# Patient Record
Sex: FEMALE | Race: BLACK OR AFRICAN AMERICAN | NOT HISPANIC OR LATINO | Employment: UNEMPLOYED | URBAN - METROPOLITAN AREA
[De-identification: names, ages, dates, MRNs, and addresses within clinical notes are randomized per-mention and may not be internally consistent; named-entity substitution may affect disease eponyms.]

---

## 2023-10-25 ENCOUNTER — TELEPHONE (OUTPATIENT)
Dept: OBSTETRICS AND GYNECOLOGY | Facility: CLINIC | Age: 38
End: 2023-10-25
Payer: MEDICAID

## 2023-10-25 NOTE — TELEPHONE ENCOUNTER
Pt has been scheduled for 11/2 with hossein valdez.   ----- Message from Hossein Valdez NP sent at 10/25/2023 11:47 AM CDT -----  Contact: patient    ----- Message -----  From: Roxana Fuentes  Sent: 10/25/2023   9:32 AM CDT  To: Milton HAQUE Staff    Type:  Patient Returning Call    Who Called:Patient     Who Left Message for Patient:Jomaranup     Does the patient know what this is regarding?:yes     Would the patient rather a call back or a response via [facility]? Call     Best Call Back Number:[phone] (home)      Additional Information:

## 2023-10-25 NOTE — TELEPHONE ENCOUNTER
Called pt. No answer mailbox is full. Pt does not have mychart  ----- Message from Tasia Rose NP sent at 10/23/2023  2:35 PM CDT -----  Regarding: FW: preg comfirmation  Contact: Patient  Please schedule patient for pregnancy confirm around 6-8 weeks of pregnancy AND after 5 day quarantine since she recently tested positive for COVID. Thanks.  ----- Message -----  From: Ana Paula Orozco  Sent: 10/19/2023  11:47 AM CDT  To: Yuri Holman Staff  Subject: preg comfirmation                                Type:  Sooner Appointment Request    Caller is requesting a sooner appointment.  Caller declined first available appointment listed below.  Caller will not accept being placed on the waitlist and is requesting a message be sent to doctor.    Name of Caller:  Patient    When is the first available appointment?      Symptoms:  medicaid - confirm preg    Would the patient rather a call back or a response via [facility]?     Best Call Back Number:  [phone]    Additional Information:  Tested positive for covid yesterday; call pt to be scheduled thanks!

## 2023-11-07 ENCOUNTER — OFFICE VISIT (OUTPATIENT)
Dept: FAMILY MEDICINE | Facility: CLINIC | Age: 38
End: 2023-11-07
Payer: MEDICAID

## 2023-11-07 VITALS
SYSTOLIC BLOOD PRESSURE: 118 MMHG | HEART RATE: 90 BPM | WEIGHT: 178.81 LBS | DIASTOLIC BLOOD PRESSURE: 60 MMHG | OXYGEN SATURATION: 96 % | TEMPERATURE: 99 F

## 2023-11-07 DIAGNOSIS — J30.2 SEASONAL ALLERGIES: ICD-10-CM

## 2023-11-07 DIAGNOSIS — R11.2 NAUSEA AND VOMITING, UNSPECIFIED VOMITING TYPE: ICD-10-CM

## 2023-11-07 DIAGNOSIS — Z32.01 PREGNANCY, CONFIRMED, NOT FIRST: Primary | ICD-10-CM

## 2023-11-07 PROCEDURE — 3078F PR MOST RECENT DIASTOLIC BLOOD PRESSURE < 80 MM HG: ICD-10-PCS | Mod: CPTII,S$GLB,, | Performed by: STUDENT IN AN ORGANIZED HEALTH CARE EDUCATION/TRAINING PROGRAM

## 2023-11-07 PROCEDURE — 99214 OFFICE O/P EST MOD 30 MIN: CPT | Mod: S$GLB,,, | Performed by: STUDENT IN AN ORGANIZED HEALTH CARE EDUCATION/TRAINING PROGRAM

## 2023-11-07 PROCEDURE — 1160F PR REVIEW ALL MEDS BY PRESCRIBER/CLIN PHARMACIST DOCUMENTED: ICD-10-PCS | Mod: CPTII,S$GLB,, | Performed by: STUDENT IN AN ORGANIZED HEALTH CARE EDUCATION/TRAINING PROGRAM

## 2023-11-07 PROCEDURE — 3078F DIAST BP <80 MM HG: CPT | Mod: CPTII,S$GLB,, | Performed by: STUDENT IN AN ORGANIZED HEALTH CARE EDUCATION/TRAINING PROGRAM

## 2023-11-07 PROCEDURE — 81025 POCT URINE PREGNANCY: ICD-10-PCS | Mod: S$GLB,,, | Performed by: STUDENT IN AN ORGANIZED HEALTH CARE EDUCATION/TRAINING PROGRAM

## 2023-11-07 PROCEDURE — 1159F MED LIST DOCD IN RCRD: CPT | Mod: CPTII,S$GLB,, | Performed by: STUDENT IN AN ORGANIZED HEALTH CARE EDUCATION/TRAINING PROGRAM

## 2023-11-07 PROCEDURE — 3074F SYST BP LT 130 MM HG: CPT | Mod: CPTII,S$GLB,, | Performed by: STUDENT IN AN ORGANIZED HEALTH CARE EDUCATION/TRAINING PROGRAM

## 2023-11-07 PROCEDURE — 3074F PR MOST RECENT SYSTOLIC BLOOD PRESSURE < 130 MM HG: ICD-10-PCS | Mod: CPTII,S$GLB,, | Performed by: STUDENT IN AN ORGANIZED HEALTH CARE EDUCATION/TRAINING PROGRAM

## 2023-11-07 PROCEDURE — 1160F RVW MEDS BY RX/DR IN RCRD: CPT | Mod: CPTII,S$GLB,, | Performed by: STUDENT IN AN ORGANIZED HEALTH CARE EDUCATION/TRAINING PROGRAM

## 2023-11-07 PROCEDURE — 1159F PR MEDICATION LIST DOCUMENTED IN MEDICAL RECORD: ICD-10-PCS | Mod: CPTII,S$GLB,, | Performed by: STUDENT IN AN ORGANIZED HEALTH CARE EDUCATION/TRAINING PROGRAM

## 2023-11-07 PROCEDURE — 81025 URINE PREGNANCY TEST: CPT | Mod: S$GLB,,, | Performed by: STUDENT IN AN ORGANIZED HEALTH CARE EDUCATION/TRAINING PROGRAM

## 2023-11-07 PROCEDURE — 99214 PR OFFICE/OUTPT VISIT, EST, LEVL IV, 30-39 MIN: ICD-10-PCS | Mod: S$GLB,,, | Performed by: STUDENT IN AN ORGANIZED HEALTH CARE EDUCATION/TRAINING PROGRAM

## 2023-11-07 RX ORDER — ONDANSETRON 4 MG/1
4 TABLET, FILM COATED ORAL EVERY 8 HOURS PRN
Qty: 30 TABLET | Refills: 1 | Status: SHIPPED | OUTPATIENT
Start: 2023-11-07 | End: 2023-12-07

## 2023-11-07 RX ORDER — FLUTICASONE PROPIONATE 50 MCG
1 SPRAY, SUSPENSION (ML) NASAL DAILY
Qty: 16 G | Refills: 5 | Status: SHIPPED | OUTPATIENT
Start: 2023-11-07 | End: 2023-12-07

## 2023-11-07 NOTE — PROGRESS NOTES
[facility] - [place], [place]    Family Medicine Office Visit    Subjective     Patient ID: Micheline Julien is a 28 y.o. female who presents to clinic today to establish care.     Medical history, surgical history, medications, allergies, and social history were reviewed and updated.     Chief Complaint: Establish Care and Emesis    Emesis         Establish care  She presents today to establish care. We have reviewed medical history, surgical history, family history, medications, allergies, and social history. EMR was updated appropriately.     Pregnancy and Nausea  She reported that her last known menstrual cycle was on .  She took an at home urine pregnancy test on  that was positive.  This is a desired pregnancy.  She is .  Point of care urine pregnancy test in our clinic is also positive.New appointment is scheduled for early December.  Recommended that she keep this appointment.  She did report nausea and vomiting that is worsening over the last few weeks.  She is requesting something to improve her symptoms that she is trying over-the-counter supplements without improvement.  We will plan to send in Zofran.  She is many concerns about the medication she is able to take safely while pregnant.  She reported that she took Tylenol frequently during her last pregnancy and was concerned that it may have affected her son.  Recommended she discuss this further with OBGYN to confirm what medication she can take safely.  Recommended against tobacco use pregnancy.  Also recommended against NSAID use.  She is on a prenatal vitamin and reports compliance.    Nasal congestion  She reported nasal congestion and a runny nose associated with her recent diagnosis of COVID.  She is requesting for the safety use during pregnancy for these symptoms.  We discussed Flonase.    Vitals:    23 1604   BP: 118/60   Pulse: 90   Temp: 98.5 °F (36.9 °C)      Wt Readings from  Last 3 Encounters:   11/07/23 1604 81.1 kg (178 lb 12.8 oz)      Review of Systems   Gastrointestinal:  Positive for vomiting.       Review of Systems otherwise negative unless specified above.        Objective     Physical Exam  Constitutional:       General: She is not in acute distress.     Appearance: Normal appearance.   HENT:      Head: Normocephalic and atraumatic.      Mouth/Throat:      Mouth: Mucous membranes are moist.   Cardiovascular:      Rate and Rhythm: Regular rhythm. Tachycardia present.      Heart sounds: No murmur heard.  Pulmonary:      Effort: Pulmonary effort is normal. No respiratory distress.      Breath sounds: Normal breath sounds. No wheezing.   Musculoskeletal:         General: Normal range of motion.   Skin:     General: Skin is warm and dry.   Neurological:      General: No focal deficit present.      Mental Status: She is alert and oriented to person, place, and time.   Psychiatric:         Mood and Affect: Mood normal.         Behavior: Behavior normal.            Assessment and Plan     Current Outpatient Medications   Medication Instructions    fluticasone propionate (FLONASE) 50 mcg, Each Nostril, Daily    ondansetron (ZOFRAN) 4 mg, Oral, Every 8 hours PRN    prenatal vit no.124/iron/folic (PRENATAL VITAMIN ORAL) 1 tablet, Oral        1. Pregnancy, confirmed, not first  -     POCT Urine Pregnancy    2. Nausea and vomiting, unspecified vomiting type  -     ondansetron (ZOFRAN) 4 MG tablet; Take 1 tablet (4 mg total) by mouth every 8 (eight) hours as needed for Nausea.  Dispense: 30 tablet; Refill: 1    3. Seasonal allergies  -     fluticasone propionate (FLONASE) 50 mcg/actuation nasal spray; 1 spray (50 mcg total) by Each Nostril route once daily.  Dispense: 16 g; Refill: 5        Positive plan of care urine pregnancy test as above.  First day of last menstrual period was September 6. She does have appointment scheduled with Dr. Blake on 12/6.  Recommended that she keep this  appointment.  We will otherwise send in Zofran for her nausea.  We will start Flonase for her nasal congestion.  We will otherwise plan to have her return in 6 months or sooner if needed.         No follow-ups on file.    Questions were invited and answered. No other acute concerns at this time. Will plan to follow up as above or sooner if needed.     Brenda Chaves,   11/07/2023 4:09 PM

## 2023-11-07 NOTE — PATIENT INSTRUCTIONS
Shawn Tobias,     If you are due for any health screening(s) below please notify me so we can arrange them to be ordered and scheduled. Most healthy patients at your age complete them, but you are free to accept or refuse.     If you can't do it, I'll definitely understand. If you can, I'd certainly appreciate it!    Tests to Keep You Healthy    Cervical Cancer Screening: DUE      Your cervical cancer screening is due     Our records indicate that you may be overdue for your screening Pap smear. A Pap smear is an important health screening that can detect abnormal cells that can become cervical cancer. Cervical cancer screenings allow for early diagnosis and increase the likelihood of successful treatment.     The current recommendation for Pap smear screening is every 3-5 years for women at average risk. We encourage you to schedule your appointment with your womens health provider. Many women see a gynecologist for this screening, but some primary care providers also provide Pap screening.     If you recently had your Pap smear screening performed outside of [facility], please let your health care team know so that they can update your health record.

## 2023-11-08 LAB
B-HCG UR QL: POSITIVE
CTP QC/QA: YES

## 2023-12-04 NOTE — PROGRESS NOTES
Chief Complaint   Patient presents with    Possible Pregnancy     Pregnancy confirmation       HPI:  38 y.o. female  presents for evaluation of amenorrhea. LMP 2023 (13w0d; LYNN 2024). Patient reports nausea/vomiting. She reports history of cerclage in her first pregnancy (thinks it was due to short cervix); did not get one in her second pregnancy. Patient lives in [place] and is currently unemployed. She cares for her kids; her youngest child is autistic.     Patient's last menstrual period was 2023 (exact date).    - Last Pap:  (unsure)  - History of abnormal paps: unsure  - Possible recent STD exposure: h/o CT    PMHx: oral HSV, scoliosis (receives nerve blocks), tobacco use   Meds: PNV  PSurgHx: h/o cerclage    PCP: Dr. Chaves    Past Medical History:   Diagnosis Date    HSV-1 (herpes simplex virus 1) infection     Lumbar herniated disc     L5    Scoliosis     Unspecified osteoarthritis, unspecified site     Right hand, left knee     Past Surgical History:   Procedure Laterality Date    CERVICAL CERCLAGE      VAGINAL DELIVERY      x2       Social History     Tobacco Use    Smoking status: Some Days     Current packs/day: 0.00     Types: Cigarettes     Last attempt to quit: 10/2023     Years since quittin.1    Smokeless tobacco: Never   Substance Use Topics    Alcohol use: Not Currently     Family History   Problem Relation Age of Onset    Breast cancer Mother     Seizures Father     Leukemia Maternal Grandmother     Cancer Paternal Grandmother      OB History    Para Term  AB Living   4 2 2   1 2   SAB IAB Ectopic Multiple Live Births   1       2      # Outcome Date [place] Lbr Daniele/2nd Weight Sex Delivery Anes PTL Lv   4 Term 22 38w0d  2.835 kg (6 lb 4 oz) M Vag-Spont EPI  BEVERLY   3 SAB            2 Term 10/23/09 39w0d  3.345 kg (7 lb 6 oz) M Vag-Spont EPI N BEVERLY      Birth Comments: cerclage   1                 MEDICATIONS: Reviewed with  "patient.  ALLERGIES: Patient has no known allergies.     ROS:  Review of Systems   Constitutional:  Negative for chills and fever.   HENT:  Negative for nasal congestion.    Eyes:  Negative for visual disturbance.   Respiratory:  Negative for shortness of breath.    Cardiovascular:  Negative for chest pain.   Gastrointestinal:  Positive for nausea and vomiting. Negative for abdominal pain.   Genitourinary:  Negative for pelvic pain, vaginal bleeding and vaginal discharge.   Integumentary:  Negative for rash.   Neurological:  Negative for headaches.   Psychiatric/Behavioral:  The patient is not nervous/anxious.        PHYSICAL EXAM:    /72   Pulse 94   Resp 12   Ht 5' 6" (1.676 m)   Wt 79.7 kg (175 lb 12.8 oz)   LMP 09/06/2023 (Exact Date)   BMI 28.37 kg/m²     Physical Exam:   Constitutional: She is oriented to person, place, and time. She appears well-developed and well-nourished. No distress.    HENT:   Head: Normocephalic and atraumatic.      Cardiovascular:  Normal rate.             Pulmonary/Chest: Effort normal. No respiratory distress.                  Musculoskeletal: Normal range of motion. No tenderness.       Neurological: She is alert and oriented to person, place, and time.    Skin: Skin is warm and dry.    Psychiatric: She has a normal mood and affect.         ASSESSMENT & PLAN:   Amenorrhea  -     POCT Urine Pregnancy positive  -     Patient counseled about NIPT and is interested  -     Pap, 1T labs, and NIPT at next visit  -     Dating ultrasound ordered  -     Counseled to avoid cat litter, not garden without gloves, avoid raw meat, heat up deli meat, to eat large fish like tuna no more than once a week, and to avoid soft unpasteurized cheeses.  I recommend a PNV daily.  She should avoid ibuprofen.    Nausea/vomiting in pregnancy  -     doxylamine succinate (UNISOM, DOXYLAMINE,) 25 mg tablet; Take 1 tablet (25 mg total) by mouth nightly as needed for Insomnia. Take 25mg of unisom and " 25mg of vitamin B6 every night.  If you are still having nausea you can take 12.5mg (0.5 tab) of unisom and 12.5mg (0.5 tab) of vitamin B6 in the morning starting on day 3.  If your nausea persists, you can add 12.5mg of unisom and 12.5mg of B6 in the afternoon starting on day 4.  Dispense: 60 tablet; Refill: 1  -     pyridoxine, vitamin B6, (B-6) 25 MG Tab; Take 1 tablet (25 mg total) by mouth nightly as needed (nausea). Take 25mg of unisom and 25mg of vitamin B6 every night.  If you are still having nausea you can take 12.5mg (0.5 tab) of unisom and 12.5mg (0.5 tab) of vitamin B6 in the morning starting on day 3.  If your nausea persists, you can add 12.5mg of unisom and 12.5mg of B6 in the afternoon starting on day 4.  Dispense: 60 tablet; Refill: 1    Tobacco use  -     Patient is a daily smoker and is interested in quitting; she understands risks in pregnancy  -     Ambulatory referral/consult to Smoking Cessation Program; Future; Expected date: 2023    History of cerclage, currently pregnant  -     Patient reports h/o cerclage in first pregnancy () in [place], thinks it was secondary to short cervix. She did not get a cerclage with her second child but reports  cervical dilation, but went on to deliver at term.   -     Ambulatory referral/consult to Perinatology; Future; Expected date: 2023    Other orders  -     PNV,calcium 72/iron/folic acid (PRENATAL VITAMIN) Tab; Take 1 tablet by mouth once daily.  Dispense: 30 tablet; Refill: 11      Return to clinic in 4 weeks for initial OB visit.     Flaquita Blake MD  OB/GYN

## 2023-12-06 ENCOUNTER — OFFICE VISIT (OUTPATIENT)
Dept: OBSTETRICS AND GYNECOLOGY | Facility: CLINIC | Age: 38
End: 2023-12-06
Payer: MEDICAID

## 2023-12-06 VITALS
BODY MASS INDEX: 28.26 KG/M2 | WEIGHT: 175.81 LBS | SYSTOLIC BLOOD PRESSURE: 116 MMHG | HEART RATE: 94 BPM | DIASTOLIC BLOOD PRESSURE: 72 MMHG | HEIGHT: 66 IN | RESPIRATION RATE: 12 BRPM

## 2023-12-06 DIAGNOSIS — N91.2 AMENORRHEA: Primary | ICD-10-CM

## 2023-12-06 DIAGNOSIS — O21.9 NAUSEA/VOMITING IN PREGNANCY: ICD-10-CM

## 2023-12-06 DIAGNOSIS — Z98.890 HISTORY OF CERCLAGE, CURRENTLY PREGNANT: ICD-10-CM

## 2023-12-06 DIAGNOSIS — Z72.0 TOBACCO USE: ICD-10-CM

## 2023-12-06 DIAGNOSIS — O09.299 HISTORY OF CERCLAGE, CURRENTLY PREGNANT: ICD-10-CM

## 2023-12-06 PROBLEM — Z32.01 PREGNANCY, CONFIRMED, NOT FIRST: Status: RESOLVED | Noted: 2023-11-07 | Resolved: 2023-12-06

## 2023-12-06 LAB
B-HCG UR QL: POSITIVE
CTP QC/QA: YES

## 2023-12-06 PROCEDURE — 99204 PR OFFICE/OUTPT VISIT, NEW, LEVL IV, 45-59 MIN: ICD-10-PCS | Mod: TH,S$GLB,, | Performed by: STUDENT IN AN ORGANIZED HEALTH CARE EDUCATION/TRAINING PROGRAM

## 2023-12-06 PROCEDURE — 99204 OFFICE O/P NEW MOD 45 MIN: CPT | Mod: TH,S$GLB,, | Performed by: STUDENT IN AN ORGANIZED HEALTH CARE EDUCATION/TRAINING PROGRAM

## 2023-12-06 PROCEDURE — 1159F MED LIST DOCD IN RCRD: CPT | Mod: CPTII,S$GLB,, | Performed by: STUDENT IN AN ORGANIZED HEALTH CARE EDUCATION/TRAINING PROGRAM

## 2023-12-06 PROCEDURE — 3074F SYST BP LT 130 MM HG: CPT | Mod: CPTII,S$GLB,, | Performed by: STUDENT IN AN ORGANIZED HEALTH CARE EDUCATION/TRAINING PROGRAM

## 2023-12-06 PROCEDURE — 3008F BODY MASS INDEX DOCD: CPT | Mod: CPTII,S$GLB,, | Performed by: STUDENT IN AN ORGANIZED HEALTH CARE EDUCATION/TRAINING PROGRAM

## 2023-12-06 PROCEDURE — 1159F PR MEDICATION LIST DOCUMENTED IN MEDICAL RECORD: ICD-10-PCS | Mod: CPTII,S$GLB,, | Performed by: STUDENT IN AN ORGANIZED HEALTH CARE EDUCATION/TRAINING PROGRAM

## 2023-12-06 PROCEDURE — 81025 POCT URINE PREGNANCY: ICD-10-PCS | Mod: S$GLB,,, | Performed by: STUDENT IN AN ORGANIZED HEALTH CARE EDUCATION/TRAINING PROGRAM

## 2023-12-06 PROCEDURE — 3008F PR BODY MASS INDEX (BMI) DOCUMENTED: ICD-10-PCS | Mod: CPTII,S$GLB,, | Performed by: STUDENT IN AN ORGANIZED HEALTH CARE EDUCATION/TRAINING PROGRAM

## 2023-12-06 PROCEDURE — 81025 URINE PREGNANCY TEST: CPT | Mod: S$GLB,,, | Performed by: STUDENT IN AN ORGANIZED HEALTH CARE EDUCATION/TRAINING PROGRAM

## 2023-12-06 PROCEDURE — 3074F PR MOST RECENT SYSTOLIC BLOOD PRESSURE < 130 MM HG: ICD-10-PCS | Mod: CPTII,S$GLB,, | Performed by: STUDENT IN AN ORGANIZED HEALTH CARE EDUCATION/TRAINING PROGRAM

## 2023-12-06 PROCEDURE — 3078F PR MOST RECENT DIASTOLIC BLOOD PRESSURE < 80 MM HG: ICD-10-PCS | Mod: CPTII,S$GLB,, | Performed by: STUDENT IN AN ORGANIZED HEALTH CARE EDUCATION/TRAINING PROGRAM

## 2023-12-06 PROCEDURE — 3078F DIAST BP <80 MM HG: CPT | Mod: CPTII,S$GLB,, | Performed by: STUDENT IN AN ORGANIZED HEALTH CARE EDUCATION/TRAINING PROGRAM

## 2023-12-06 RX ORDER — PYRIDOXINE HCL (VITAMIN B6) 25 MG
25 TABLET ORAL NIGHTLY PRN
Qty: 60 TABLET | Refills: 1 | Status: SHIPPED | OUTPATIENT
Start: 2023-12-06

## 2023-12-06 RX ORDER — PRENATAL WITH FERROUS FUM AND FOLIC ACID 3080; 920; 120; 400; 22; 1.84; 3; 20; 10; 1; 12; 200; 27; 25; 2 [IU]/1; [IU]/1; MG/1; [IU]/1; MG/1; MG/1; MG/1; MG/1; MG/1; MG/1; UG/1; MG/1; MG/1; MG/1; MG/1
1 TABLET ORAL DAILY
Qty: 30 TABLET | Refills: 11 | Status: SHIPPED | OUTPATIENT
Start: 2023-12-06 | End: 2024-12-05

## 2023-12-06 RX ORDER — DOXYLAMINE SUCCINATE 25 MG/1
25 TABLET ORAL NIGHTLY PRN
Qty: 60 TABLET | Refills: 1 | Status: SHIPPED | OUTPATIENT
Start: 2023-12-06

## 2023-12-07 DIAGNOSIS — O21.9 NAUSEA AND VOMITING DURING PREGNANCY: Primary | ICD-10-CM

## 2023-12-07 RX ORDER — ONDANSETRON 4 MG/1
4 TABLET, ORALLY DISINTEGRATING ORAL EVERY 6 HOURS PRN
Qty: 30 TABLET | Refills: 1 | Status: SHIPPED | OUTPATIENT
Start: 2023-12-07

## 2023-12-08 ENCOUNTER — TELEPHONE (OUTPATIENT)
Dept: MATERNAL FETAL MEDICINE | Facility: CLINIC | Age: 38
End: 2023-12-08

## 2023-12-08 ENCOUNTER — PROCEDURE VISIT (OUTPATIENT)
Dept: MATERNAL FETAL MEDICINE | Facility: CLINIC | Age: 38
End: 2023-12-08
Payer: MEDICAID

## 2023-12-08 DIAGNOSIS — O09.299 HISTORY OF CERCLAGE, CURRENTLY PREGNANT: ICD-10-CM

## 2023-12-08 DIAGNOSIS — N91.2 AMENORRHEA: ICD-10-CM

## 2023-12-08 DIAGNOSIS — Z36.89 ENCOUNTER FOR FETAL ANATOMIC SURVEY: Primary | ICD-10-CM

## 2023-12-08 DIAGNOSIS — Z98.890 HISTORY OF CERCLAGE, CURRENTLY PREGNANT: ICD-10-CM

## 2023-12-08 PROCEDURE — 76801 US OB/GYN PROCEDURE (VIEWPOINT): ICD-10-PCS | Mod: S$GLB,,, | Performed by: OBSTETRICS & GYNECOLOGY

## 2023-12-08 PROCEDURE — 76801 OB US < 14 WKS SINGLE FETUS: CPT | Mod: S$GLB,,, | Performed by: OBSTETRICS & GYNECOLOGY

## 2023-12-08 NOTE — TELEPHONE ENCOUNTER
Pt verified self by name, . NKA.    Pt in clinic and had u/s dating. LYNN 24.    RN offered pt soonest avail appt for consult with MFM. Pt state she is on the fence about getting another cerclage as she didn't have one with her second child and delivered close to full term. Pt expressed she had a bad experience with her 1st cerclage and does not want to go through with getting epidural twice during pregnancy as she has scoliosis.     RN set up patient for serial CL measurements & anatomy scan starting at 16w up to [id number]. Initial consult with MFM at 16w u/s. Pt agreed to appt time/date. RN offered patient if she changed her mind  to meet with MFM sooner than /3 to give me a call back.     Pt has not gotten PNLs & genetics done yet. PT unsure of what day she goes back to see her OB in January. RN reach out to Dr. Blake to have her office set up lab draw appt sooner than her next OB visit so MFM will have results for consult. Provider agreed.     Patient expressed her gratitude in her care with her OBGYN and with MFM office staff.     Pt verbalized understanding. Agreed to POC w intent to comply.

## 2023-12-10 DIAGNOSIS — Z3A.12 12 WEEKS GESTATION OF PREGNANCY: Primary | ICD-10-CM

## 2023-12-14 ENCOUNTER — LAB VISIT (OUTPATIENT)
Dept: LAB | Facility: CLINIC | Age: 38
End: 2023-12-14
Payer: MEDICAID

## 2023-12-14 DIAGNOSIS — Z3A.12 12 WEEKS GESTATION OF PREGNANCY: ICD-10-CM

## 2023-12-14 LAB
ABO + RH BLD: NORMAL
ANION GAP SERPL CALC-SCNC: 9 MMOL/L (ref 8–16)
BASOPHILS # BLD AUTO: 0.02 K/UL (ref 0–0.2)
BASOPHILS NFR BLD: 0.2 % (ref 0–1.9)
BLD GP AB SCN CELLS X3 SERPL QL: NORMAL
BUN SERPL-MCNC: 8 MG/DL (ref 6–20)
CALCIUM SERPL-MCNC: 9.3 MG/DL (ref 8.7–10.5)
CHLORIDE SERPL-SCNC: 104 MMOL/L (ref 95–110)
CO2 SERPL-SCNC: 20 MMOL/L (ref 23–29)
CREAT SERPL-MCNC: 0.7 MG/DL (ref 0.5–1.4)
DIFFERENTIAL METHOD: ABNORMAL
EOSINOPHIL # BLD AUTO: 0.4 K/UL (ref 0–0.5)
EOSINOPHIL NFR BLD: 3 % (ref 0–8)
ERYTHROCYTE [DISTWIDTH] IN BLOOD BY AUTOMATED COUNT: 12.1 % (ref 11.5–14.5)
EST. GFR  (NO RACE VARIABLE): >60 ML/MIN/1.73 M^2
GLUCOSE SERPL-MCNC: 98 MG/DL (ref 70–110)
HBV SURFACE AG SERPL QL IA: NORMAL
HCT VFR BLD AUTO: 31.2 % (ref 37–48.5)
HCV AB SERPL QL IA: NORMAL
HGB BLD-MCNC: 10.5 G/DL (ref 12–16)
HIV 1+2 AB+HIV1 P24 AG SERPL QL IA: NORMAL
IMM GRANULOCYTES # BLD AUTO: 0.04 K/UL (ref 0–0.04)
IMM GRANULOCYTES NFR BLD AUTO: 0.3 % (ref 0–0.5)
LYMPHOCYTES # BLD AUTO: 1.7 K/UL (ref 1–4.8)
LYMPHOCYTES NFR BLD: 14.7 % (ref 18–48)
MCH RBC QN AUTO: 32.1 PG (ref 27–31)
MCHC RBC AUTO-ENTMCNC: 33.7 G/DL (ref 32–36)
MCV RBC AUTO: 95 FL (ref 82–98)
MONOCYTES # BLD AUTO: 0.9 K/UL (ref 0.3–1)
MONOCYTES NFR BLD: 7.7 % (ref 4–15)
NEUTROPHILS # BLD AUTO: 8.7 K/UL (ref 1.8–7.7)
NEUTROPHILS NFR BLD: 74.1 % (ref 38–73)
NRBC BLD-RTO: 0 /100 WBC
PLATELET # BLD AUTO: 289 K/UL (ref 150–450)
PMV BLD AUTO: 10.9 FL (ref 9.2–12.9)
POTASSIUM SERPL-SCNC: 3.5 MMOL/L (ref 3.5–5.1)
RBC # BLD AUTO: 3.27 M/UL (ref 4–5.4)
SODIUM SERPL-SCNC: 133 MMOL/L (ref 136–145)
SPECIMEN OUTDATE: NORMAL
WBC # BLD AUTO: 11.68 K/UL (ref 3.9–12.7)

## 2023-12-14 PROCEDURE — 86592 SYPHILIS TEST NON-TREP QUAL: CPT | Performed by: STUDENT IN AN ORGANIZED HEALTH CARE EDUCATION/TRAINING PROGRAM

## 2023-12-14 PROCEDURE — 36415 PR COLLECTION VENOUS BLOOD,VENIPUNCTURE: ICD-10-PCS | Mod: ,,, | Performed by: STUDENT IN AN ORGANIZED HEALTH CARE EDUCATION/TRAINING PROGRAM

## 2023-12-14 PROCEDURE — 83020 HEMOGLOBIN ELECTROPHORESIS: CPT | Performed by: STUDENT IN AN ORGANIZED HEALTH CARE EDUCATION/TRAINING PROGRAM

## 2023-12-14 PROCEDURE — 83021 HEMOGLOBIN CHROMOTOGRAPHY: CPT | Performed by: STUDENT IN AN ORGANIZED HEALTH CARE EDUCATION/TRAINING PROGRAM

## 2023-12-14 PROCEDURE — 86762 RUBELLA ANTIBODY: CPT | Performed by: STUDENT IN AN ORGANIZED HEALTH CARE EDUCATION/TRAINING PROGRAM

## 2023-12-14 PROCEDURE — 80048 BASIC METABOLIC PNL TOTAL CA: CPT | Performed by: STUDENT IN AN ORGANIZED HEALTH CARE EDUCATION/TRAINING PROGRAM

## 2023-12-14 PROCEDURE — 85025 COMPLETE CBC W/AUTO DIFF WBC: CPT | Performed by: STUDENT IN AN ORGANIZED HEALTH CARE EDUCATION/TRAINING PROGRAM

## 2023-12-14 PROCEDURE — 86803 HEPATITIS C AB TEST: CPT | Performed by: STUDENT IN AN ORGANIZED HEALTH CARE EDUCATION/TRAINING PROGRAM

## 2023-12-14 PROCEDURE — 87389 HIV-1 AG W/HIV-1&-2 AB AG IA: CPT | Performed by: STUDENT IN AN ORGANIZED HEALTH CARE EDUCATION/TRAINING PROGRAM

## 2023-12-14 PROCEDURE — 87340 HEPATITIS B SURFACE AG IA: CPT | Performed by: STUDENT IN AN ORGANIZED HEALTH CARE EDUCATION/TRAINING PROGRAM

## 2023-12-14 PROCEDURE — 36415 COLL VENOUS BLD VENIPUNCTURE: CPT | Mod: ,,, | Performed by: STUDENT IN AN ORGANIZED HEALTH CARE EDUCATION/TRAINING PROGRAM

## 2023-12-14 PROCEDURE — 86850 RBC ANTIBODY SCREEN: CPT | Performed by: STUDENT IN AN ORGANIZED HEALTH CARE EDUCATION/TRAINING PROGRAM

## 2023-12-15 ENCOUNTER — OFFICE VISIT (OUTPATIENT)
Dept: FAMILY MEDICINE | Facility: CLINIC | Age: 38
End: 2023-12-15
Payer: MEDICAID

## 2023-12-15 VITALS
WEIGHT: 181.13 LBS | TEMPERATURE: 99 F | BODY MASS INDEX: 29.11 KG/M2 | SYSTOLIC BLOOD PRESSURE: 110 MMHG | DIASTOLIC BLOOD PRESSURE: 65 MMHG | OXYGEN SATURATION: 97 % | HEIGHT: 66 IN | HEART RATE: 110 BPM

## 2023-12-15 DIAGNOSIS — R06.2 WHEEZING: ICD-10-CM

## 2023-12-15 DIAGNOSIS — R50.9 FEVER, UNSPECIFIED FEVER CAUSE: Primary | ICD-10-CM

## 2023-12-15 LAB
RPR SER QL: NORMAL
RUBV IGG SER-ACNC: 53.1 IU/ML
RUBV IGG SER-IMP: REACTIVE

## 2023-12-15 PROCEDURE — 1160F PR REVIEW ALL MEDS BY PRESCRIBER/CLIN PHARMACIST DOCUMENTED: ICD-10-PCS | Mod: CPTII,S$GLB,, | Performed by: STUDENT IN AN ORGANIZED HEALTH CARE EDUCATION/TRAINING PROGRAM

## 2023-12-15 PROCEDURE — 99214 PR OFFICE/OUTPT VISIT, EST, LEVL IV, 30-39 MIN: ICD-10-PCS | Mod: S$GLB,,, | Performed by: STUDENT IN AN ORGANIZED HEALTH CARE EDUCATION/TRAINING PROGRAM

## 2023-12-15 PROCEDURE — 3074F SYST BP LT 130 MM HG: CPT | Mod: CPTII,S$GLB,, | Performed by: STUDENT IN AN ORGANIZED HEALTH CARE EDUCATION/TRAINING PROGRAM

## 2023-12-15 PROCEDURE — 3078F DIAST BP <80 MM HG: CPT | Mod: CPTII,S$GLB,, | Performed by: STUDENT IN AN ORGANIZED HEALTH CARE EDUCATION/TRAINING PROGRAM

## 2023-12-15 PROCEDURE — 1160F RVW MEDS BY RX/DR IN RCRD: CPT | Mod: CPTII,S$GLB,, | Performed by: STUDENT IN AN ORGANIZED HEALTH CARE EDUCATION/TRAINING PROGRAM

## 2023-12-15 PROCEDURE — 3008F PR BODY MASS INDEX (BMI) DOCUMENTED: ICD-10-PCS | Mod: CPTII,S$GLB,, | Performed by: STUDENT IN AN ORGANIZED HEALTH CARE EDUCATION/TRAINING PROGRAM

## 2023-12-15 PROCEDURE — 3078F PR MOST RECENT DIASTOLIC BLOOD PRESSURE < 80 MM HG: ICD-10-PCS | Mod: CPTII,S$GLB,, | Performed by: STUDENT IN AN ORGANIZED HEALTH CARE EDUCATION/TRAINING PROGRAM

## 2023-12-15 PROCEDURE — 1159F PR MEDICATION LIST DOCUMENTED IN MEDICAL RECORD: ICD-10-PCS | Mod: CPTII,S$GLB,, | Performed by: STUDENT IN AN ORGANIZED HEALTH CARE EDUCATION/TRAINING PROGRAM

## 2023-12-15 PROCEDURE — 3008F BODY MASS INDEX DOCD: CPT | Mod: CPTII,S$GLB,, | Performed by: STUDENT IN AN ORGANIZED HEALTH CARE EDUCATION/TRAINING PROGRAM

## 2023-12-15 PROCEDURE — 1159F MED LIST DOCD IN RCRD: CPT | Mod: CPTII,S$GLB,, | Performed by: STUDENT IN AN ORGANIZED HEALTH CARE EDUCATION/TRAINING PROGRAM

## 2023-12-15 PROCEDURE — 99214 OFFICE O/P EST MOD 30 MIN: CPT | Mod: S$GLB,,, | Performed by: STUDENT IN AN ORGANIZED HEALTH CARE EDUCATION/TRAINING PROGRAM

## 2023-12-15 PROCEDURE — 3074F PR MOST RECENT SYSTOLIC BLOOD PRESSURE < 130 MM HG: ICD-10-PCS | Mod: CPTII,S$GLB,, | Performed by: STUDENT IN AN ORGANIZED HEALTH CARE EDUCATION/TRAINING PROGRAM

## 2023-12-15 RX ORDER — AMOXICILLIN 500 MG/1
500 TABLET, FILM COATED ORAL EVERY 12 HOURS
Qty: 20 TABLET | Refills: 0 | Status: SHIPPED | OUTPATIENT
Start: 2023-12-15 | End: 2023-12-25

## 2023-12-15 RX ORDER — ALBUTEROL SULFATE 90 UG/1
2 AEROSOL, METERED RESPIRATORY (INHALATION) EVERY 6 HOURS PRN
Qty: 18 G | Refills: 0 | Status: SHIPPED | OUTPATIENT
Start: 2023-12-15 | End: 2024-12-14

## 2023-12-15 NOTE — PROGRESS NOTES
[facility]  [address], [address]  [place] [address]    Subjective     Patient ID: Micheline Julien is a 38 y.o. female who comes to the clinic for an acute visit.    Chief Complaint: Cough, Fever, Nasal Congestion (Patient flu  COVID positive get test yesterday ), and Headache    Children got sick and then she got sick soon after. Went to the  yesterday and was negative for covid and flu. Temp got to 101.0. Has been taking tylenol. Is wheezing    ROS negative unless stated above       Objective     Vitals:    12/15/23 1106   BP: 110/65   Pulse: 110   Temp: 98.5 °F (36.9 °C)        Physical Exam  Vitals reviewed.   Constitutional:       Appearance: Normal appearance.   HENT:      Head: Normocephalic and atraumatic.   Eyes:      General: No scleral icterus (albuterol).     Extraocular Movements: Extraocular movements intact.      Pupils: Pupils are equal, round, and reactive to light.   Cardiovascular:      Rate and Rhythm: Normal rate.      Pulses: Normal pulses.      Heart sounds: Normal heart sounds.   Pulmonary:      Effort: Pulmonary effort is normal. No respiratory distress.      Breath sounds: Wheezing present.   Musculoskeletal:         General: Normal range of motion.      Cervical back: Normal range of motion and neck supple.   Skin:     General: Skin is dry.      Capillary Refill: Capillary refill takes less than 2 seconds.   Neurological:      General: No focal deficit present.      Mental Status: She is alert and oriented to person, place, and time. Mental status is at baseline.   Psychiatric:         Mood and Affect: Mood normal.         Behavior: Behavior normal.         Thought Content: Thought content normal.         Wt Readings from Last 3 Encounters:   12/15/23 1106 82.1 kg (181 lb 1.6 oz)   12/06/23 0947 79.7 kg (175 lb 12.8 oz)   11/07/23 1604 81.1 kg (178 lb 12.8 oz)        Current Outpatient Medications   Medication  Instructions    albuterol (VENTOLIN HFA) 90 mcg/actuation inhaler 2 puffs, Inhalation, Every 6 hours PRN, Rescue    amoxicillin (AMOXIL) 500 mg, Oral, Every 12 hours    ondansetron (ZOFRAN-ODT) 4 mg, Oral, Every 6 hours PRN    PNV,calcium 72/iron/folic acid (PRENATAL VITAMIN) Tab 1 tablet, Oral, Daily    prenatal vit no.124/iron/folic (PRENATAL VITAMIN ORAL) 1 tablet, Oral    pyridoxine (vitamin B6) (B-6) 25 mg, Oral, Nightly PRN, Take 25mg of unisom and 25mg of vitamin B6 every night.  If you are still having nausea you can take 12.5mg (0.5 tab) of unisom and 12.5mg (0.5 tab) of vitamin B6 in the morning starting on day 3.  If your nausea persists, you can add 12.5mg of unisom and 12.5mg of B6 in the afternoon starting on day 4.    UNISOM (DOXYLAMINE) 25 mg, Oral, Nightly PRN, Take 25mg of unisom and 25mg of vitamin B6 every night.  If you are still having nausea you can take 12.5mg (0.5 tab) of unisom and 12.5mg (0.5 tab) of vitamin B6 in the morning starting on day 3.  If your nausea persists, you can add 12.5mg of unisom and 12.5mg of B6 in the afternoon starting on day 4.           Assessment and Plan     1. Fever, unspecified fever cause  -     amoxicillin (AMOXIL) 500 MG Tab; Take 1 tablet (500 mg total) by mouth every 12 (twelve) hours. for 10 days  Dispense: 20 tablet; Refill: 0    2. Wheezing  -     albuterol (VENTOLIN HFA) 90 mcg/actuation inhaler; Inhale 2 puffs into the lungs every 6 (six) hours as needed for Wheezing. Rescue  Dispense: 18 g; Refill: 0        Treating sinusitis w/ amoxil. Wheezing w/ albuterol. Went over SE.     Continue follow up w/ OBGYN, patient said she wanted to see her OBGYN before starting baby ASA. Continue PNV    Encouraged smoking cessation         I encouraged the patient to take all medications as prescribed and to keep follow up appointments with their providers. Patient stated they had no other concerns. Questions were invited and answered. Follow up sooner if need. ED  precautions given.    Mac Fairbanks MD  12/15/2023 11:13 AM

## 2023-12-20 DIAGNOSIS — D64.9 ANEMIA, UNSPECIFIED TYPE: Primary | ICD-10-CM

## 2023-12-20 LAB
HGB A2 MFR BLD HPLC: 2.6 % (ref 2.2–3.2)
HGB FRACT BLD ELPH-IMP: NORMAL
HGB FRACT BLD ELPH-IMP: NORMAL

## 2024-01-02 ENCOUNTER — PATIENT MESSAGE (OUTPATIENT)
Dept: MATERNAL FETAL MEDICINE | Facility: CLINIC | Age: 39
End: 2024-01-02
Payer: MEDICAID

## 2024-01-02 NOTE — PROGRESS NOTES
MATERNAL-FETAL MEDICINE   CONSULT NOTE    Provider requesting consultation: Flaquita Blake MD     SUBJECTIVE:     Ms. Micheline Julien is a 38 y.o.  female with IUP at 16w1d who is seen in consultation by M for evaluation and management of:  History of short cervix prior pregnancy with cerclage placement   Subsequent term delivery with no cerclage   Evaluation for management recommendations          Medication List with Changes/Refills   Current Medications    ALBUTEROL (VENTOLIN HFA) 90 MCG/ACTUATION INHALER    Inhale 2 puffs into the lungs every 6 (six) hours as needed for Wheezing. Rescue    DOXYLAMINE SUCCINATE (UNISOM, DOXYLAMINE,) 25 MG TABLET    Take 1 tablet (25 mg total) by mouth nightly as needed for Insomnia. Take 25mg of unisom and 25mg of vitamin B6 every night.  If you are still having nausea you can take 12.5mg (0.5 tab) of unisom and 12.5mg (0.5 tab) of vitamin B6 in the morning starting on day 3.  If your nausea persists, you can add 12.5mg of unisom and 12.5mg of B6 in the afternoon starting on day 4.    ONDANSETRON (ZOFRAN-ODT) 4 MG TBDL    Take 1 tablet (4 mg total) by mouth every 6 (six) hours as needed (nausea).    PNV,CALCIUM 72/IRON/FOLIC ACID (PRENATAL VITAMIN) TAB    Take 1 tablet by mouth once daily.    PRENATAL VIT NO.124/IRON/FOLIC (PRENATAL VITAMIN ORAL)    Take 1 tablet by mouth.    PYRIDOXINE, VITAMIN B6, (B-6) 25 MG TAB    Take 1 tablet (25 mg total) by mouth nightly as needed (nausea). Take 25mg of unisom and 25mg of vitamin B6 every night.  If you are still having nausea you can take 12.5mg (0.5 tab) of unisom and 12.5mg (0.5 tab) of vitamin B6 in the morning starting on day 3.  If your nausea persists, you can add 12.5mg of unisom and 12.5mg of B6 in the afternoon starting on day 4.       Review of patient's allergies indicates:  No Known Allergies    PMH:  Past Medical History:   Diagnosis Date    HSV-1 (herpes simplex virus 1) infection     Lumbar herniated disc     L5     "Scoliosis     Unspecified osteoarthritis, unspecified site     Right hand, left knee       PObHx:  OB History    Para Term  AB Living   4 2 2   1 2   SAB IAB Ectopic Multiple Live Births   1       2      # Outcome Date [place] Lbr Daniele/2nd Weight Sex Delivery Anes PTL Lv   4 Current            3 Term 22 38w0d  2.835 kg (6 lb 4 oz) M Vag-Spont EPI  BEVERLY   2 SAB            1 Term 10/23/09 39w0d  3.345 kg (7 lb 6 oz) M Vag-Spont EPI N BEVERLY      Birth Comments: cerclage       PSH:  Past Surgical History:   Procedure Laterality Date    CERVICAL CERCLAGE      VAGINAL DELIVERY      x2       Family history:family history includes Breast cancer in her mother; Cancer in her paternal grandmother; Leukemia in her maternal grandmother; Seizures in her father.    Social history: reports that she has been smoking cigarettes. She has never used smokeless tobacco. She reports that she does not currently use alcohol. She reports that she does not use drugs.  She is cutting down on tobacco intake---praised.     Genetic history:  The patient denies any inherited genetic diseases or birth defects in herself or her partner's personal history or family.    Objective:   Ht 5' 6" (1.676 m)   LMP 2023 (Exact Date) Comment: prot neg, gluc neg urine  BMI 29.23 kg/m²    /65   Ht 5' 6" (1.676 m)   Wt 81 kg (178 lb 7.4 oz)   LMP 2023 (Exact Date) Comment: prot neg, gluc neg urine  BMI 28.80 kg/m²      Physical Exam  WDWN in NAD     Ultrasound performed. See viewpoint for full ultrasound report.  Cervical length in normal on TV ultrasound.  Limited fetal anatomic views appear normal.  Fetal biometry measures appropriately based on LYNN from first trimester ultrasound.    Significant labs/imaging:  Vicki: low risk     ASSESSMENT/PLAN:     38 y.o.  female with IUP at 16w1d     History of Cerclage/Evaluation for Management recommendations  Patient had cerclage placed in first pregnancy in mid trimester " after US revealed short cervix.  Thereafter, she remained at home rest.  She did not have PTL . Ultimately, she delivered at 38 weeks.   Second pregnancy patient did not have cerclage, or US surveillance of cervical length. She delivered at 39 weeks.     Discussed management options and reassured patient she is likely at low risk for PTB.  Patient desires serial TV US for cervical length.   No return MD appointments were made.  We will reassess if cervical length is short.       FOLLOW UP:   No history of  birth.  Prior cerclage and subsequent pregnancy with no cerclage went to term.  Patient desires serial TVUS.  Return visits scheduled for US only in 2 weeks, 4 weeks, and 6 weeks.   Please reach out to us if we can be of any further assistance.   Sushma Melton MD      30 minutes of total time spent on the encounter, which includes face to face time and non-face to face time preparing to see the patient (eg, review of tests), obtaining and/or reviewing separately obtained history, documenting clinical information in the electronic or other health record, independently interpreting results (not separately reported) and communicating results to the patient/family/caregiver, or care coordination (not separately reported).      Sushma Melton  Maternal-Fetal Medicine    Electronically Signed by Sushma Melton 2024

## 2024-01-03 ENCOUNTER — OFFICE VISIT (OUTPATIENT)
Dept: MATERNAL FETAL MEDICINE | Facility: CLINIC | Age: 39
End: 2024-01-03
Payer: MEDICAID

## 2024-01-03 ENCOUNTER — PROCEDURE VISIT (OUTPATIENT)
Dept: MATERNAL FETAL MEDICINE | Facility: CLINIC | Age: 39
End: 2024-01-03
Payer: MEDICAID

## 2024-01-03 VITALS
DIASTOLIC BLOOD PRESSURE: 65 MMHG | BODY MASS INDEX: 28.68 KG/M2 | HEIGHT: 66 IN | SYSTOLIC BLOOD PRESSURE: 119 MMHG | WEIGHT: 178.44 LBS

## 2024-01-03 DIAGNOSIS — Z98.890 HISTORY OF CERCLAGE, CURRENTLY PREGNANT: ICD-10-CM

## 2024-01-03 DIAGNOSIS — Z36.89 ENCOUNTER FOR ULTRASOUND TO ASSESS FETAL GROWTH: ICD-10-CM

## 2024-01-03 DIAGNOSIS — Z36.89 ENCOUNTER FOR FETAL ANATOMIC SURVEY: ICD-10-CM

## 2024-01-03 DIAGNOSIS — Z98.890 HISTORY OF CERCLAGE, CURRENTLY PREGNANT: Primary | ICD-10-CM

## 2024-01-03 DIAGNOSIS — O09.299 HISTORY OF CERCLAGE, CURRENTLY PREGNANT: Primary | ICD-10-CM

## 2024-01-03 DIAGNOSIS — O09.299 HISTORY OF CERCLAGE, CURRENTLY PREGNANT: ICD-10-CM

## 2024-01-03 PROCEDURE — 1159F MED LIST DOCD IN RCRD: CPT | Mod: CPTII,S$GLB,, | Performed by: OBSTETRICS & GYNECOLOGY

## 2024-01-03 PROCEDURE — 3008F BODY MASS INDEX DOCD: CPT | Mod: CPTII,S$GLB,, | Performed by: OBSTETRICS & GYNECOLOGY

## 2024-01-03 PROCEDURE — 76815 OB US LIMITED FETUS(S): CPT | Mod: S$GLB,,, | Performed by: OBSTETRICS & GYNECOLOGY

## 2024-01-03 PROCEDURE — 76817 TRANSVAGINAL US OBSTETRIC: CPT | Mod: S$GLB,,, | Performed by: OBSTETRICS & GYNECOLOGY

## 2024-01-03 PROCEDURE — 99214 OFFICE O/P EST MOD 30 MIN: CPT | Mod: TH,S$GLB,, | Performed by: OBSTETRICS & GYNECOLOGY

## 2024-01-03 PROCEDURE — 3078F DIAST BP <80 MM HG: CPT | Mod: CPTII,S$GLB,, | Performed by: OBSTETRICS & GYNECOLOGY

## 2024-01-03 PROCEDURE — 3074F SYST BP LT 130 MM HG: CPT | Mod: CPTII,S$GLB,, | Performed by: OBSTETRICS & GYNECOLOGY

## 2024-01-05 ENCOUNTER — PATIENT MESSAGE (OUTPATIENT)
Dept: ADMINISTRATIVE | Facility: HOSPITAL | Age: 39
End: 2024-01-05
Payer: MEDICAID

## 2024-01-12 LAB
HUMAN PAPILLOMAVIRUS (HPV): NORMAL
PAP RECOMMENDATION EXT: NORMAL
PAP SMEAR: NORMAL

## 2024-01-17 ENCOUNTER — PROCEDURE VISIT (OUTPATIENT)
Dept: MATERNAL FETAL MEDICINE | Facility: CLINIC | Age: 39
End: 2024-01-17
Payer: MEDICAID

## 2024-01-17 DIAGNOSIS — Z36.89 ENCOUNTER FOR FETAL ANATOMIC SURVEY: ICD-10-CM

## 2024-01-17 DIAGNOSIS — Z98.890 HISTORY OF CERCLAGE, CURRENTLY PREGNANT: ICD-10-CM

## 2024-01-17 DIAGNOSIS — O09.299 HISTORY OF CERCLAGE, CURRENTLY PREGNANT: ICD-10-CM

## 2024-01-17 PROCEDURE — 76817 TRANSVAGINAL US OBSTETRIC: CPT | Mod: S$GLB,,, | Performed by: STUDENT IN AN ORGANIZED HEALTH CARE EDUCATION/TRAINING PROGRAM

## 2024-01-17 PROCEDURE — 76815 OB US LIMITED FETUS(S): CPT | Mod: S$GLB,,, | Performed by: STUDENT IN AN ORGANIZED HEALTH CARE EDUCATION/TRAINING PROGRAM

## 2024-01-22 ENCOUNTER — PATIENT OUTREACH (OUTPATIENT)
Dept: ADMINISTRATIVE | Facility: HOSPITAL | Age: 39
End: 2024-01-22
Payer: MEDICAID

## 2024-01-22 NOTE — PROGRESS NOTES
Population Health Chart Review & Patient Outreach Details    Outreach Performed: YES Portal    Additional [facility] Health Notes:           Updates Requested / Reviewed:      Updated Care Coordination Note and External Sources: [facility] and [facility]         Health Maintenance Topics Overdue:    Health Maintenance Due   Topic Date Due    Cervical Cancer Screening  Never done    Lipid Panel  Never done    COVID-19 Vaccine (1) Never done    Pneumococcal Vaccines (Age 0-64) (1 - PCV) Never done    TETANUS VACCINE  Never done    Hemoglobin A1c (Diabetic Prevention Screening)  Never done    Influenza Vaccine (1) Never done         Health Maintenance Topic(s) Outreach Outcomes & Actions Taken:    Cervical Cancer Screening - Outreach Outcomes & Actions Taken  : External Records Uploaded & Care Team Updated if Applicable

## 2024-02-13 NOTE — PROGRESS NOTES
MATERNAL-FETAL MEDICINE   CONSULT NOTE     Provider requesting consultation: Flaquita Blake MD      SUBJECTIVE:      Ms. Micheline Julien is a 38 y.o.  female with IUP at 22w2d who is seen in consultation by [facility] for evaluation and management of:  History of short cervix prior pregnancy with cerclage placement   Subsequent term delivery with no cerclage   Evaluation for management recommendations previously performed  Presents for repeat US and MFM evaluation              Medication List with Changes/Refills   Current Medications     ALBUTEROL (VENTOLIN HFA) 90 MCG/ACTUATION INHALER    Inhale 2 puffs into the lungs every 6 (six) hours as needed for Wheezing. Rescue     DOXYLAMINE SUCCINATE (UNISOM, DOXYLAMINE,) 25 MG TABLET    Take 1 tablet (25 mg total) by mouth nightly as needed for Insomnia. Take 25mg of unisom and 25mg of vitamin B6 every night.  If you are still having nausea you can take 12.5mg (0.5 tab) of unisom and 12.5mg (0.5 tab) of vitamin B6 in the morning starting on day 3.  If your nausea persists, you can add 12.5mg of unisom and 12.5mg of B6 in the afternoon starting on day 4.     ONDANSETRON (ZOFRAN-ODT) 4 MG TBDL    Take 1 tablet (4 mg total) by mouth every 6 (six) hours as needed (nausea).     PNV,CALCIUM 72/IRON/FOLIC ACID (PRENATAL VITAMIN) TAB    Take 1 tablet by mouth once daily.     PRENATAL VIT NO.124/IRON/FOLIC (PRENATAL VITAMIN ORAL)    Take 1 tablet by mouth.     PYRIDOXINE, VITAMIN B6, (B-6) 25 MG TAB    Take 1 tablet (25 mg total) by mouth nightly as needed (nausea). Take 25mg of unisom and 25mg of vitamin B6 every night.  If you are still having nausea you can take 12.5mg (0.5 tab) of unisom and 12.5mg (0.5 tab) of vitamin B6 in the morning starting on day 3.  If your nausea persists, you can add 12.5mg of unisom and 12.5mg of B6 in the afternoon starting on day 4.         Review of patient's allergies indicates:  No Known Allergies     PMH:       Past Medical History:    Diagnosis Date    HSV-1 (herpes simplex virus 1) infection      Lumbar herniated disc       L5    Scoliosis      Unspecified osteoarthritis, unspecified site       Right hand, left knee         PObHx:                   OB History    Para Term  AB Living   4 2 2   1 2   SAB IAB Ectopic Multiple Live Births      1       2          # Outcome Date GA Lbr Daniele/2nd Weight Sex Delivery Anes PTL Lv   4 Current                     3 Term 22 38w0d   2.835 kg (6 lb 4 oz) M Vag-Spont EPI   BEVERLY   2 SAB                    1 Term 10/23/09 39w0d   3.345 kg (7 lb 6 oz) M Vag-Spont EPI N BEVERLY      Birth Comments: cerclage         PSH:        Past Surgical History:   Procedure Laterality Date    CERVICAL CERCLAGE        VAGINAL DELIVERY         x2         Family history:family history includes Breast cancer in her mother; Cancer in her paternal grandmother; Leukemia in her maternal grandmother; Seizures in her father.     Social history: reports that she has been smoking cigarettes. She has never used smokeless tobacco. She reports that she does not currently use alcohol. She reports that she does not use drugs.  She is cutting down on tobacco intake---praised.      Genetic history:  The patient denies any inherited genetic diseases or birth defects in herself or her partner's personal history or family.     Objective:   BP (!) 121/59   Wt 81.9 kg (180 lb 9.3 oz)   LMP 2023 (Exact Date) Comment: prot neg, gluc neg urine  BMI 29.15 kg/m²       Physical Exam  WDWN in NAD      Ultrasound performed 1/3/24 See viewpoint for full ultrasound report.  Cervical length in normal on TV ultrasound (43 mm).  Limited fetal anatomic views appear normal.  Fetal biometry measures appropriately based on LYNN from first trimester ultrasound.  Patient cancelled FU visits for cervical length due to  issues with her autistic son.    Please see imaging tab for Viewpoint study performed 24.  A detailed fetal  "anatomic ultrasound examination was performed for the following high risk indication: AMA.   No fetal structural malformations are identified; however, fetal imaging is incomplete today.   A follow-up study will be scheduled to complete the fetal anatomic survey.   Fetal size today is consistent with established gestational age.   Cervical length by TA scanning is normal (45.4 mm)  Placental location is posterior without evidence of previa.   A small, simple left ovarian cyst is seen.  No additional evaluation is needed for this finding.     Significant labs/imaging:  Vicki: low risk      ASSESSMENT/PLAN:      38 y.o.  female with IUP at 22w2d      History of Cerclage/Evaluation for Management recommendations  Patient had cerclage placed in first pregnancy in mid trimester after US revealed short cervix.  Thereafter, she remained at home rest.  She did not have PTL . Ultimately, she delivered at 38 weeks.   Second pregnancy patient did not have cerclage, or US surveillance of cervical length. She delivered at 39 weeks.    Previously Counseled:   "Discussed management options and reassured patient she is likely at low risk for PTB.  Patient desires serial TV US for cervical length.   No return MD appointments were made.  We will reassess if cervical length is short. "    Repeat US today at 22.2 weeks is reassuring.         FOLLOW UP:   No history of  birth.  Prior cerclage and subsequent pregnancy with no cerclage went to term.  Patient desired serial TVUS. Initial cervical length normal at 17 weeks.   Return visits scheduled for US only in 2 weeks, 4 weeks, and 6 weeks for cervical length assessment and fetal anatomy ultrasound.   Patient missed some appointments due to  issues.  Seen today and cervical length normal. Limited view of CSP due to fetal position.  Return 6 weeks US only FU anatomy.  No additional MD visits needed.  Sushma Melton MD    30 minutes of total time spent on the " encounter, which includes face to face time and non-face to face time preparing to see the patient (eg, review of tests), obtaining and/or reviewing separately obtained history, documenting clinical information in the electronic or other health record, independently interpreting results (not separately reported) and communicating results to the patient/family/caregiver, or care coordination (not separately reported).

## 2024-02-14 ENCOUNTER — PROCEDURE VISIT (OUTPATIENT)
Dept: MATERNAL FETAL MEDICINE | Facility: CLINIC | Age: 39
End: 2024-02-14
Payer: MEDICAID

## 2024-02-14 ENCOUNTER — OFFICE VISIT (OUTPATIENT)
Dept: MATERNAL FETAL MEDICINE | Facility: CLINIC | Age: 39
End: 2024-02-14
Payer: MEDICAID

## 2024-02-14 VITALS
SYSTOLIC BLOOD PRESSURE: 121 MMHG | DIASTOLIC BLOOD PRESSURE: 59 MMHG | BODY MASS INDEX: 29.15 KG/M2 | WEIGHT: 180.56 LBS

## 2024-02-14 DIAGNOSIS — O09.299 HISTORY OF CERCLAGE, CURRENTLY PREGNANT: ICD-10-CM

## 2024-02-14 DIAGNOSIS — Z98.890 HISTORY OF CERCLAGE, CURRENTLY PREGNANT: ICD-10-CM

## 2024-02-14 DIAGNOSIS — O09.522 ADVANCED MATERNAL AGE IN MULTIGRAVIDA, SECOND TRIMESTER: Primary | ICD-10-CM

## 2024-02-14 DIAGNOSIS — Z03.75 SUSPECTED CERVICAL SHORTENING NOT FOUND: ICD-10-CM

## 2024-02-14 DIAGNOSIS — Z36.89 ENCOUNTER FOR FETAL ANATOMIC SURVEY: ICD-10-CM

## 2024-02-14 DIAGNOSIS — Z36.89 ENCOUNTER FOR ULTRASOUND TO ASSESS FETAL GROWTH: ICD-10-CM

## 2024-02-14 PROCEDURE — 3074F SYST BP LT 130 MM HG: CPT | Mod: CPTII,S$GLB,, | Performed by: OBSTETRICS & GYNECOLOGY

## 2024-02-14 PROCEDURE — 3008F BODY MASS INDEX DOCD: CPT | Mod: CPTII,S$GLB,, | Performed by: OBSTETRICS & GYNECOLOGY

## 2024-02-14 PROCEDURE — 3078F DIAST BP <80 MM HG: CPT | Mod: CPTII,S$GLB,, | Performed by: OBSTETRICS & GYNECOLOGY

## 2024-02-14 PROCEDURE — 76811 OB US DETAILED SNGL FETUS: CPT | Mod: S$GLB,,, | Performed by: OBSTETRICS & GYNECOLOGY

## 2024-02-14 PROCEDURE — 1159F MED LIST DOCD IN RCRD: CPT | Mod: CPTII,S$GLB,, | Performed by: OBSTETRICS & GYNECOLOGY

## 2024-02-14 PROCEDURE — 99214 OFFICE O/P EST MOD 30 MIN: CPT | Mod: TH,S$GLB,, | Performed by: OBSTETRICS & GYNECOLOGY

## 2024-02-14 RX ORDER — ASPIRIN 81 MG/1
TABLET, FILM COATED ORAL
COMMUNITY
Start: 2024-02-09

## 2024-02-14 RX ORDER — FERROUS SULFATE 325(65) MG
TABLET, DELAYED RELEASE (ENTERIC COATED) ORAL
COMMUNITY
Start: 2024-01-12

## 2024-02-14 RX ORDER — FERROUS SULFATE TAB 325 MG (65 MG ELEMENTAL FE) 325 (65 FE) MG
TAB ORAL
COMMUNITY
Start: 2024-01-22

## 2024-03-18 ENCOUNTER — PROCEDURE VISIT (OUTPATIENT)
Dept: MATERNAL FETAL MEDICINE | Facility: CLINIC | Age: 39
End: 2024-03-18
Payer: MEDICAID

## 2024-03-18 DIAGNOSIS — Z36.89 ENCOUNTER FOR ULTRASOUND TO ASSESS FETAL GROWTH: ICD-10-CM

## 2024-03-18 PROCEDURE — 76816 OB US FOLLOW-UP PER FETUS: CPT | Mod: S$GLB,,, | Performed by: STUDENT IN AN ORGANIZED HEALTH CARE EDUCATION/TRAINING PROGRAM

## 2024-07-23 ENCOUNTER — OFFICE VISIT (OUTPATIENT)
Dept: FAMILY MEDICINE | Facility: CLINIC | Age: 39
End: 2024-07-23
Payer: MEDICAID

## 2024-07-23 VITALS
TEMPERATURE: 99 F | DIASTOLIC BLOOD PRESSURE: 80 MMHG | HEIGHT: 66 IN | BODY MASS INDEX: 25.49 KG/M2 | SYSTOLIC BLOOD PRESSURE: 120 MMHG | WEIGHT: 158.63 LBS

## 2024-07-23 DIAGNOSIS — Z30.09 BIRTH CONTROL COUNSELING: ICD-10-CM

## 2024-07-23 DIAGNOSIS — H10.13 ALLERGIC CONJUNCTIVITIS OF BOTH EYES: Primary | ICD-10-CM

## 2024-07-23 DIAGNOSIS — F17.200 TOBACCO DEPENDENCE: ICD-10-CM

## 2024-07-23 PROBLEM — M67.432 GANGLION OF LEFT WRIST: Status: ACTIVE | Noted: 2020-07-17

## 2024-07-23 PROBLEM — J45.909 ASTHMA: Status: ACTIVE | Noted: 2024-07-23

## 2024-07-23 PROBLEM — Z98.890 HISTORY OF CERCLAGE, CURRENTLY PREGNANT: Status: RESOLVED | Noted: 2023-12-06 | Resolved: 2024-07-23

## 2024-07-23 PROBLEM — B00.9 HSV-2 INFECTION: Status: ACTIVE | Noted: 2024-07-23

## 2024-07-23 PROBLEM — R11.2 NAUSEA AND VOMITING: Status: RESOLVED | Noted: 2023-11-07 | Resolved: 2024-07-23

## 2024-07-23 PROBLEM — O09.299 HISTORY OF CERCLAGE, CURRENTLY PREGNANT: Status: RESOLVED | Noted: 2023-12-06 | Resolved: 2024-07-23

## 2024-07-23 PROCEDURE — 1159F MED LIST DOCD IN RCRD: CPT | Mod: CPTII,S$GLB,, | Performed by: STUDENT IN AN ORGANIZED HEALTH CARE EDUCATION/TRAINING PROGRAM

## 2024-07-23 PROCEDURE — 1160F RVW MEDS BY RX/DR IN RCRD: CPT | Mod: CPTII,S$GLB,, | Performed by: STUDENT IN AN ORGANIZED HEALTH CARE EDUCATION/TRAINING PROGRAM

## 2024-07-23 PROCEDURE — 99406 BEHAV CHNG SMOKING 3-10 MIN: CPT | Mod: S$GLB,,, | Performed by: STUDENT IN AN ORGANIZED HEALTH CARE EDUCATION/TRAINING PROGRAM

## 2024-07-23 PROCEDURE — 99214 OFFICE O/P EST MOD 30 MIN: CPT | Mod: 25,S$GLB,, | Performed by: STUDENT IN AN ORGANIZED HEALTH CARE EDUCATION/TRAINING PROGRAM

## 2024-07-23 PROCEDURE — 3074F SYST BP LT 130 MM HG: CPT | Mod: CPTII,S$GLB,, | Performed by: STUDENT IN AN ORGANIZED HEALTH CARE EDUCATION/TRAINING PROGRAM

## 2024-07-23 PROCEDURE — 3079F DIAST BP 80-89 MM HG: CPT | Mod: CPTII,S$GLB,, | Performed by: STUDENT IN AN ORGANIZED HEALTH CARE EDUCATION/TRAINING PROGRAM

## 2024-07-23 PROCEDURE — 3008F BODY MASS INDEX DOCD: CPT | Mod: CPTII,S$GLB,, | Performed by: STUDENT IN AN ORGANIZED HEALTH CARE EDUCATION/TRAINING PROGRAM

## 2024-07-23 RX ORDER — VALACYCLOVIR HYDROCHLORIDE 500 MG/1
500 TABLET, FILM COATED ORAL DAILY
COMMUNITY
Start: 2024-05-22 | End: 2025-05-17

## 2024-07-23 RX ORDER — CETIRIZINE HYDROCHLORIDE 10 MG/1
10 TABLET ORAL DAILY
Qty: 30 TABLET | Refills: 5 | Status: SHIPPED | OUTPATIENT
Start: 2024-07-23 | End: 2025-07-23

## 2024-07-23 RX ORDER — NORETHINDRONE 0.35 MG/1
TABLET ORAL DAILY
COMMUNITY
Start: 2024-07-01

## 2024-07-23 RX ORDER — OLOPATADINE HYDROCHLORIDE 1 MG/ML
1 SOLUTION/ DROPS OPHTHALMIC 2 TIMES DAILY
Qty: 5 ML | Refills: 0 | Status: SHIPPED | OUTPATIENT
Start: 2024-07-23 | End: 2025-07-23

## 2024-07-23 NOTE — PROGRESS NOTES
[facility]  Primary Care Clinic - [place], [place]    Family Medicine Office Visit    Subjective     Patient ID: Micheline Julien is a 38 y.o. female who presents to clinic today for an acute visit.     Medical history, surgical history, medications, allergies, and social history were reviewed and updated.     Chief Complaint: Eye Pain (EYE PAIN IN BOTH EYES, RED, SWOLLEN AND CLOSING)    HPI    Eye swelling/irritation  Symptoms started approximately 48 hours ago. Started having swelling and soreness after waking up. Also reporting increased tearing. Reports itching as well. Does not wear contacts. Reporting some discharge from her right eye and some crusting this morning. Not taking anything for her symptoms. Using hot compresses.     Birth control counseling  Reported that she recently delivered her baby on Terese 3, 2024.  Reported that she was started on Micronor birth control.  Reported that she was interested in switching to something else.  Reported that she was interested in a Nexplanon.  Reported that she was still working on tobacco cessation.  Reported that she was recently started trying nicotine patches and chewing gum to quit smoking.  Recommended that she touch base with the OBGYN regarding this.  Discussed that she would likely need to stop smoking prior to using this form of birth control.  Discussed that we are more than happy to assist her further if needed.    Vitals:    07/23/24 1020   BP: 120/80   Temp: 98.8 °F (37.1 °C)      Wt Readings from Last 3 Encounters:   07/23/24 1020 71.9 kg (158 lb 9.6 oz)   02/14/24 0818 81.9 kg (180 lb 9.3 oz)   01/03/24 1214 81 kg (178 lb 7.4 oz)      Review of Systems    Review of Systems otherwise negative unless specified above.        Objective     Physical Exam  Vitals reviewed.   Constitutional:       General: She is not in acute distress.     Appearance: Normal appearance.   HENT:      Head: Normocephalic and atraumatic.      Nose: Nose normal.       Mouth/Throat:      Mouth: Mucous membranes are moist.   Eyes:      General: Allergic shiner present. No visual field deficit or scleral icterus.        Right eye: No discharge.         Left eye: No discharge.      Conjunctiva/sclera:      Right eye: Right conjunctiva is not injected. No exudate or hemorrhage.     Left eye: Left conjunctiva is not injected. No exudate or hemorrhage.  Cardiovascular:      Rate and Rhythm: Normal rate and regular rhythm.      Heart sounds: No murmur heard.  Pulmonary:      Effort: Pulmonary effort is normal. No respiratory distress.      Breath sounds: Normal breath sounds.   Musculoskeletal:         General: Normal range of motion.   Skin:     General: Skin is warm and dry.   Neurological:      General: No focal deficit present.      Mental Status: She is alert and oriented to person, place, and time.   Psychiatric:         Mood and Affect: Mood normal.         Behavior: Behavior normal.            Assessment and Plan     Current Outpatient Medications   Medication Instructions    albuterol (VENTOLIN HFA) 90 mcg/actuation inhaler 2 puffs, Inhalation, Every 6 hours PRN, Rescue    cetirizine (ZYRTEC) 10 mg, Oral, Daily    ferrous sulfate 325 (65 FE) MG EC tablet Oral    norethindrone (MICRONOR) 0.35 mg tablet Oral, Daily    olopatadine (PATANOL) 0.1 % ophthalmic solution 1 drop, Both Eyes, 2 times daily    PNV,calcium 72/iron/folic acid (PRENATAL VITAMIN) Tab 1 tablet, Oral, Daily    pyridoxine (vitamin B6) (B-6) 25 mg, Oral, Nightly PRN, Take 25mg of unisom and 25mg of vitamin B6 every night.  If you are still having nausea you can take 12.5mg (0.5 tab) of unisom and 12.5mg (0.5 tab) of vitamin B6 in the morning starting on day 3.  If your nausea persists, you can add 12.5mg of unisom and 12.5mg of B6 in the afternoon starting on day 4.    valACYclovir (VALTREX) 500 mg, Oral, Daily        1. Allergic conjunctivitis of both eyes  -     olopatadine (PATANOL) 0.1 % ophthalmic  solution; Place 1 drop into both eyes 2 (two) times daily.  Dispense: 5 mL; Refill: 0  -     cetirizine (ZYRTEC) 10 MG tablet; Take 1 tablet (10 mg total) by mouth once daily.  Dispense: 30 tablet; Refill: 5    2. Tobacco dependence    3. Birth control counseling        As she was largely having itching and swelling, we will plan to treat her symptoms with Pataday eyedrops and Zyrtec.  Discussed that if symptoms worsen or fail to improve, she can let us know and we will send in an antibiotic.  Recommended that she continue working on tobacco cessation.  Recommended that she touch base with OBGYN regarding next steps for switching her birth control.  We will otherwise plan to have her follow up in 4 weeks for lab work and to reassess her symptoms.    Spent >5 minutes counseling patient on importance of smoking cessation through use of social networks, nicotine replacement therapy, and use of prescription medication if desire.         Follow up in about 4 weeks (around 8/20/2024) for Follow up with Anastacio.    Questions were invited and answered. No other acute concerns at this time. Will plan to follow up as above or sooner if needed.     Brenda Chaves, DO  07/23/2024 10:25 AM       This dictation has been generated using Modal Fluency Dictation. Some phonetic errors may occur. Please contact author for clarification if needed.

## 2024-07-23 NOTE — PATIENT INSTRUCTIONS
Shawn Tobias,     If you are due for any health screening(s) below please notify me so we can arrange them to be ordered and scheduled. Most healthy patients at your age complete them, but you are free to accept or refuse.     If you can't do it, I'll definitely understand. If you can, I'd certainly appreciate it!    All of your core healthy metrics are met.      Were here to help you quit smoking     Our records indicated that you are still smoking. One of the best things you can do for your health is to stop smoking and we are here to help.     Talk with your provider about our Smoking Cessation Program and how we can support you on your journey.

## 2025-01-10 ENCOUNTER — OFFICE VISIT (OUTPATIENT)
Dept: FAMILY MEDICINE | Facility: CLINIC | Age: 40
End: 2025-01-10
Payer: MEDICAID

## 2025-01-10 VITALS
SYSTOLIC BLOOD PRESSURE: 128 MMHG | BODY MASS INDEX: 25.23 KG/M2 | OXYGEN SATURATION: 99 % | HEIGHT: 66 IN | DIASTOLIC BLOOD PRESSURE: 88 MMHG | WEIGHT: 157 LBS | HEART RATE: 92 BPM

## 2025-01-10 DIAGNOSIS — F33.1 MODERATE EPISODE OF RECURRENT MAJOR DEPRESSIVE DISORDER: ICD-10-CM

## 2025-01-10 DIAGNOSIS — M54.32 SCIATICA OF LEFT SIDE: Primary | ICD-10-CM

## 2025-01-10 DIAGNOSIS — Z86.32 HISTORY OF GESTATIONAL DIABETES: ICD-10-CM

## 2025-01-10 DIAGNOSIS — Z13.220 SCREENING FOR LIPID DISORDERS: ICD-10-CM

## 2025-01-10 DIAGNOSIS — D64.9 ANEMIA, UNSPECIFIED TYPE: ICD-10-CM

## 2025-01-10 RX ORDER — DULOXETIN HYDROCHLORIDE 30 MG/1
30 CAPSULE, DELAYED RELEASE ORAL DAILY
Qty: 30 CAPSULE | Refills: 2 | Status: SHIPPED | OUTPATIENT
Start: 2025-01-10 | End: 2026-01-10

## 2025-01-10 RX ORDER — DULOXETIN HYDROCHLORIDE 30 MG/1
30 CAPSULE, DELAYED RELEASE ORAL DAILY
Qty: 30 CAPSULE | Refills: 11 | Status: SHIPPED | OUTPATIENT
Start: 2025-01-10 | End: 2025-01-10

## 2025-01-10 NOTE — PATIENT INSTRUCTIONS
Shawn Tobias,     If you are due for any health screening(s) below please notify me so we can arrange them to be ordered and scheduled. Most healthy patients at your age complete them, but you are free to accept or refuse.     If you can't do it, I'll definitely understand. If you can, I'd certainly appreciate it!    Tests to Keep You Healthy    Cervical Cancer Screening: Met on 1/12/2024  Tobacco Cessation: NO      Were here to help you quit smoking     Our records indicated that you are still smoking. One of the best things you can do for your health is to stop smoking and we are here to help.     Talk with your provider about our Smoking Cessation Program and how we can support you on your journey.

## 2025-01-10 NOTE — PROGRESS NOTES
[facility]  Primary Care Clinic - [place], [place]    Family Medicine Office Visit    Subjective     Patient ID: Micheline Julien is a 39 y.o. female who presents to clinic today to follow up.     Medical history, surgical history, medications, allergies, and social history were reviewed and updated.     Chief Complaint: Leg Pain and Sciatica (Left side sharp pain since childbirth 7 months ago.  Has seen pain management in the past in [place], [place].  Has not established with pain management since moving to the [facility].)    HPI    Left sided sciatica  Reporting left sided leg pain and sciatica pain. She delivered her third baby on Terese 3. She reported that after she had her baby, her back was very painful. She has a history of scoliosis and a herniated disc. Reports that she is having pain from her low back to her knee. She reports she has had nerve blocks in the past. She has tried some over the counter pain medication.     Depression  She has a history of depression as previously taking Lexapro 10 mg daily.  Reports she has run out this medication for some time.  Reported that she does feel that her mood is not where she wants to be in she would like to initiate taking something to improve this.  We discussed a trial of duloxetine as this would help with depression and pain, which she is agreeable to.  We will initiate 30 mg daily with 6 week follow up.  No suicidal or homicidal ideation at this time.    Anemia  She has a history of anemia that was found during her last pregnancy.  Agreeable to lab work for monitoring.  Does report that she is taking iron supplementation over-the-counter.    Vitals:    01/10/25 1326   BP: 128/88   Pulse: 92      Wt Readings from Last 3 Encounters:   01/10/25 1326 71.2 kg (157 lb)   07/23/24 1020 71.9 kg (158 lb 9.6 oz)   02/14/24 0818 81.9 kg (180 lb 9.3 oz)      Review of Systems    Review of Systems otherwise negative unless specified above.        Objective      Physical Exam  Vitals reviewed.   Constitutional:       General: She is not in acute distress.     Appearance: Normal appearance.   HENT:      Head: Normocephalic and atraumatic.      Nose: Nose normal.      Mouth/Throat:      Mouth: Mucous membranes are moist.   Cardiovascular:      Rate and Rhythm: Regular rhythm. Tachycardia present.      Heart sounds: No murmur heard.  Pulmonary:      Effort: Pulmonary effort is normal. No respiratory distress.      Breath sounds: Normal breath sounds.   Abdominal:      Tenderness: There is right CVA tenderness and left CVA tenderness.   Musculoskeletal:         General: Normal range of motion.   Skin:     General: Skin is warm and dry.   Neurological:      General: No focal deficit present.      Mental Status: She is alert and oriented to person, place, and time.   Psychiatric:         Mood and Affect: Mood normal.         Behavior: Behavior normal.            Assessment and Plan     Current Outpatient Medications   Medication Instructions    albuterol (VENTOLIN HFA) 90 mcg/actuation inhaler 2 puffs, Inhalation, Every 6 hours PRN, Rescue    cetirizine (ZYRTEC) 10 mg, Oral, Daily    DULoxetine (CYMBALTA) 30 mg, Oral, Daily    ferrous sulfate 325 (65 FE) MG EC tablet Take by mouth.    norethindrone (MICRONOR) 0.35 mg tablet Daily    olopatadine (PATANOL) 0.1 % ophthalmic solution 1 drop, Both Eyes, 2 times daily    PNV,calcium 72/iron/folic acid (PRENATAL VITAMIN) Tab 1 tablet, Oral, Daily    prenatal vit27,calcium-iron-FA (TRINATAL RX 1/VINATE ONE) 60 mg iron-1 mg Tab 1 tablet, Oral, Daily    pyridoxine (vitamin B6) (B-6) 25 mg, Oral, Nightly PRN, Take 25mg of unisom and 25mg of vitamin B6 every night.  If you are still having nausea you can take 12.5mg (0.5 tab) of unisom and 12.5mg (0.5 tab) of vitamin B6 in the morning starting on day 3.  If your nausea persists, you can add 12.5mg of unisom and 12.5mg of B6 in the afternoon starting on day 4.    valACYclovir (VALTREX)  500 mg, Daily        1. Sciatica of left side  -     Discontinue: DULoxetine (CYMBALTA) 30 MG capsule; Take 1 capsule (30 mg total) by mouth once daily.  Dispense: 30 capsule; Refill: 11  -     DULoxetine (CYMBALTA) 30 MG capsule; Take 1 capsule (30 mg total) by mouth once daily.  Dispense: 30 capsule; Refill: 2    2. Moderate episode of recurrent major depressive disorder  -     Discontinue: DULoxetine (CYMBALTA) 30 MG capsule; Take 1 capsule (30 mg total) by mouth once daily.  Dispense: 30 capsule; Refill: 11  -     DULoxetine (CYMBALTA) 30 MG capsule; Take 1 capsule (30 mg total) by mouth once daily.  Dispense: 30 capsule; Refill: 2    3. Anemia, unspecified type  -     CBC auto differential; Future; Expected date: 01/10/2025  -     Iron and TIBC; Future; Expected date: 01/10/2025  -     Ferritin; Future; Expected date: 01/10/2025  -     Renal Function Panel; Future  -     Vitamin B12; Future; Expected date: 01/10/2025    4. History of gestational diabetes  -     Hemoglobin A1C; Future    5. Screening for lipid disorders  -     Lipid Panel; Future        Starting Cymbalta as above for sciatica and depression as above.  Completing blood work as above for screening.  We will plan for 6 week follow up to monitor her mood and titrate medications as needed.  She will contact her insurance to determine if there has a pain management specialist that accepts her insurance locally.    Visit today included increased complexity associated with the care of the episodic problem sciatica, depression addressed and managing the longitudinal care of the patient due to the serious and/or complex managed problem(s) sciatica, depression.         Follow up in about 6 weeks (around 2/21/2025) for Follow up with Anastacio.    Questions were invited and answered. No other acute concerns at this time. Will plan to follow up as above or sooner if needed.     Brenda Chaves,   01/10/2025 1:34 PM       This dictation has been generated  using Modal Fluency Dictation. Some phonetic errors may occur. Please contact author for clarification if needed.

## 2025-01-17 ENCOUNTER — TELEPHONE (OUTPATIENT)
Dept: FAMILY MEDICINE | Facility: CLINIC | Age: 40
End: 2025-01-17

## 2025-01-17 ENCOUNTER — PATIENT MESSAGE (OUTPATIENT)
Dept: FAMILY MEDICINE | Facility: CLINIC | Age: 40
End: 2025-01-17
Payer: MEDICAID

## 2025-01-17 NOTE — TELEPHONE ENCOUNTER
----- Message from [place] sent at 1/17/2025 11:48 AM CST -----  Type:  Needs Medical Advice    Who Called: the patient  Symptoms (please be specific):  How long has patient had these symptoms:    Pharmacy name and phone #:    Would the patient rather a call back or a response via [facility]? call back/  Best Call Back Number: [phone]   Additional Information: Provide fax [phone] for pain mgmt referral ([facility])  Please advise  Thanks

## 2025-03-28 ENCOUNTER — TELEPHONE (OUTPATIENT)
Dept: FAMILY MEDICINE | Facility: CLINIC | Age: 40
End: 2025-03-28

## 2025-03-28 NOTE — LETTER
March 28, 2025    Micheline Isabelle Wily  [address] [place] [address]             [facility]  [address] [place] [address]  Phone: [phone] Dear Ms. Micheline Julien:    We are sorry that you missed your appointment with Dr. Brenda Chaves on 3/28/2025. Your health and follow-up medical care are important to us. Please call our office as soon as possible so that we may reschedule your appointment. If you have already rescheduled your appointment, please disregard this letter.    Sincerely,        Brenda Chaves, DO